# Patient Record
Sex: FEMALE | Race: BLACK OR AFRICAN AMERICAN | NOT HISPANIC OR LATINO | URBAN - METROPOLITAN AREA
[De-identification: names, ages, dates, MRNs, and addresses within clinical notes are randomized per-mention and may not be internally consistent; named-entity substitution may affect disease eponyms.]

---

## 2018-01-25 ENCOUNTER — EMERGENCY (EMERGENCY)
Facility: HOSPITAL | Age: 23
LOS: 1 days | Discharge: HOME | End: 2018-01-25
Attending: EMERGENCY MEDICINE
Payer: MEDICAID

## 2018-01-25 VITALS
HEIGHT: 62 IN | HEART RATE: 117 BPM | TEMPERATURE: 102 F | WEIGHT: 190.04 LBS | DIASTOLIC BLOOD PRESSURE: 81 MMHG | OXYGEN SATURATION: 99 % | RESPIRATION RATE: 20 BRPM | SYSTOLIC BLOOD PRESSURE: 141 MMHG

## 2018-01-25 PROCEDURE — 99284 EMERGENCY DEPT VISIT MOD MDM: CPT | Mod: 25

## 2018-01-25 PROCEDURE — 99284 EMERGENCY DEPT VISIT MOD MDM: CPT

## 2018-01-25 PROCEDURE — 96374 THER/PROPH/DIAG INJ IV PUSH: CPT

## 2018-01-25 RX ORDER — SODIUM CHLORIDE 9 MG/ML
1000 INJECTION INTRAMUSCULAR; INTRAVENOUS; SUBCUTANEOUS ONCE
Qty: 0 | Refills: 0 | Status: COMPLETED | OUTPATIENT
Start: 2018-01-25 | End: 2018-01-25

## 2018-01-25 RX ORDER — KETOROLAC TROMETHAMINE 30 MG/ML
30 SYRINGE (ML) INJECTION ONCE
Qty: 0 | Refills: 0 | Status: DISCONTINUED | OUTPATIENT
Start: 2018-01-25 | End: 2018-01-25

## 2018-01-25 RX ADMIN — SODIUM CHLORIDE 4000 MILLILITER(S): 9 INJECTION INTRAMUSCULAR; INTRAVENOUS; SUBCUTANEOUS at 10:38

## 2018-01-25 RX ADMIN — Medication 30 MILLIGRAM(S): at 10:38

## 2018-01-25 NOTE — ED PROVIDER NOTE - MEDICAL DECISION MAKING DETAILS
feels better after meds.  Discussed anticipatory guidance and return precautions. Discussed results and gave copy to pt.  Dc with outpt follow up.

## 2018-01-25 NOTE — ED PROVIDER NOTE - OBJECTIVE STATEMENT
23 y/o F pt w/ no PMHx presents to the ED c/o flu-like sx x yesterday. Pt reports a fever of 102 this morning as well as body aches, weakness, and coughing. No other complaints. NKDA.

## 2018-04-09 ENCOUNTER — APPOINTMENT (OUTPATIENT)
Dept: OBGYN | Facility: CLINIC | Age: 23
End: 2018-04-09
Payer: MEDICAID

## 2018-04-09 ENCOUNTER — RESULT REVIEW (OUTPATIENT)
Age: 23
End: 2018-04-09

## 2018-04-09 ENCOUNTER — OUTPATIENT (OUTPATIENT)
Dept: OUTPATIENT SERVICES | Facility: HOSPITAL | Age: 23
LOS: 1 days | End: 2018-04-09
Payer: MEDICAID

## 2018-04-09 VITALS
DIASTOLIC BLOOD PRESSURE: 75 MMHG | WEIGHT: 203 LBS | TEMPERATURE: 98.2 F | HEIGHT: 62 IN | BODY MASS INDEX: 37.36 KG/M2 | SYSTOLIC BLOOD PRESSURE: 109 MMHG | HEART RATE: 68 BPM

## 2018-04-09 DIAGNOSIS — Z30.09 ENCOUNTER FOR OTHER GENERAL COUNSELING AND ADVICE ON CONTRACEPTION: ICD-10-CM

## 2018-04-09 DIAGNOSIS — Z00.00 ENCOUNTER FOR GENERAL ADULT MEDICAL EXAMINATION WITHOUT ABNORMAL FINDINGS: ICD-10-CM

## 2018-04-09 DIAGNOSIS — Z01.419 ENCOUNTER FOR GYNECOLOGICAL EXAMINATION (GENERAL) (ROUTINE) W/OUT ABNORMAL FINDINGS: ICD-10-CM

## 2018-04-09 PROCEDURE — G0463: CPT

## 2018-04-09 PROCEDURE — 99385 PREV VISIT NEW AGE 18-39: CPT

## 2018-04-09 RX ORDER — NORGESTREL AND ETHINYL ESTRADIOL 0.3-0.03MG
0.3-3 KIT ORAL DAILY
Qty: 28 | Refills: 5 | Status: ACTIVE | COMMUNITY
Start: 2018-04-09 | End: 1900-01-01

## 2018-04-13 LAB
SPECIMEN SOURCE: SIGNIFICANT CHANGE UP
T VAGINALIS RRNA SPEC QL NAA+PROBE: SIGNIFICANT CHANGE UP

## 2018-04-17 DIAGNOSIS — Z01.419 ENCOUNTER FOR GYNECOLOGICAL EXAMINATION (GENERAL) (ROUTINE) WITHOUT ABNORMAL FINDINGS: ICD-10-CM

## 2018-04-17 DIAGNOSIS — Z30.09 ENCOUNTER FOR OTHER GENERAL COUNSELING AND ADVICE ON CONTRACEPTION: ICD-10-CM

## 2018-05-03 ENCOUNTER — TRANSCRIPTION ENCOUNTER (OUTPATIENT)
Age: 23
End: 2018-05-03

## 2018-11-27 ENCOUNTER — TRANSCRIPTION ENCOUNTER (OUTPATIENT)
Age: 23
End: 2018-11-27

## 2020-01-02 NOTE — ED ADULT NURSE NOTE - CAS TRG GEN SKIN COLOR

## 2020-08-30 ENCOUNTER — EMERGENCY (EMERGENCY)
Facility: HOSPITAL | Age: 25
LOS: 1 days | Discharge: HOME | End: 2020-08-30
Attending: STUDENT IN AN ORGANIZED HEALTH CARE EDUCATION/TRAINING PROGRAM
Payer: MEDICAID

## 2020-08-30 VITALS
TEMPERATURE: 99 F | HEART RATE: 88 BPM | SYSTOLIC BLOOD PRESSURE: 120 MMHG | WEIGHT: 199.96 LBS | OXYGEN SATURATION: 98 % | DIASTOLIC BLOOD PRESSURE: 75 MMHG | RESPIRATION RATE: 16 BRPM | HEIGHT: 63 IN

## 2020-08-30 PROCEDURE — 99283 EMERGENCY DEPT VISIT LOW MDM: CPT | Mod: 25

## 2020-08-30 PROCEDURE — 73630 X-RAY EXAM OF FOOT: CPT

## 2020-08-30 PROCEDURE — 29515 APPLICATION SHORT LEG SPLINT: CPT | Mod: LT

## 2020-08-30 PROCEDURE — 73630 X-RAY EXAM OF FOOT: CPT | Mod: 26,LT

## 2020-08-30 RX ORDER — IBUPROFEN 200 MG
600 TABLET ORAL ONCE
Refills: 0 | Status: COMPLETED | OUTPATIENT
Start: 2020-08-30 | End: 2020-08-30

## 2020-08-30 RX ADMIN — Medication 600 MILLIGRAM(S): at 19:40

## 2020-08-30 NOTE — ED PROVIDER NOTE - ATTENDING CONTRIBUTION TO CARE
I performed the initial face to face bedside interview with this patient regarding history of present illness, review of symptoms and past medical, social and family history.  I completed an independent physical examination.  I was the initial provider who evaluated this patient.  The history, review of symptoms and examination was documented by the scribe in my presence and I attest to the accuracy of the documentation.  I have signed out the follow up of any pending tests (i.e. labs, radiological studies) to the PA/NP.  I have discussed the patient’s plan of care and disposition with the PA/NP. well appearing female, no acute distress, limping gait. concern for fracture. xrays.

## 2020-08-30 NOTE — ED PROVIDER NOTE - NSFOLLOWUPINSTRUCTIONS_ED_ALL_ED_FT
Follow up with the podiatry clinic within 1 week.  For pain you can take over the counter Ibuprofen 600 mg orally every 6 hours as needed for pain. Take medication with food.   If you experience any new or worsening symptoms or if you are concerned you can always come back to the emergency for a re-evaluation.

## 2020-08-30 NOTE — ED PROVIDER NOTE - OBJECTIVE STATEMENT
24 y/o F pt with no significant PMHx presents to ED s/p fall downstairs today c/o L foot pain. Pt reports she was walking downstairs and was in a rush when she misstepped and fell onto her side. Pt states she heard a crack from her L foot and was limping immediately after the fall. Per pt she went to her car afterwards where her pain and swelling worsened causing her the inability to ambulate. Pt denies LOC, tingling, numbness, head injury, wrist pain, elbow pain, shoulder pain, back pain, daily medication use, or any other acute complaints. NKDA.

## 2020-08-30 NOTE — ED PROVIDER NOTE - PROGRESS NOTE DETAILS
XR shows no fracture but due to localized swelling/tenderness, inability to walk and felt of "crack sensation" during injury will apply posterior splint due to possible occult fracture. Patient will need to follow up with podiatry within 1 week. Non-weight bearing with crutches. Pt is well appearing walking with steady gait, stable for discharge and follow up without fail with medical doctor. I had a detailed discussion with the patient and/or guardian regarding the historical points, exam findings, and any diagnostic results supporting the discharge diagnosis. Pt educated on care and need for follow up. Strict return instructions and red flag signs and symptoms discussed with patient. Questions answered. Pt shows understanding of discharge information and agrees to follow.

## 2020-08-30 NOTE — ED ADULT NURSE NOTE - OBJECTIVE STATEMENT
Pt c/o left ankle pain after tripped on last step going down stairs. Pt denies hitting head, LOC. No acute distress noted, denies chest pain, no shortness of breath indicated. Safety maintained.

## 2020-08-30 NOTE — ED ADULT NURSE NOTE - CAS ELECT INFOMATION PROVIDED
crutches instruction given via teach back and demonstration pt ambulated in hallway with steady gait with crutches/DC instructions

## 2020-08-30 NOTE — ED PROVIDER NOTE - PATIENT PORTAL LINK FT
You can access the FollowMyHealth Patient Portal offered by [facility] by registering at the following website: [email or web address]. By joining [employer]’s FollowMyHealth portal, you will also be able to view your health information using other applications (apps) compatible with our system.

## 2020-08-30 NOTE — ED PROVIDER NOTE - NSFOLLOWUPCLINICS_GEN_ALL_ED_FT
[facility]  Podiatry/Wound Care  [address] [place] [address]  Phone: [phone]  Fax: [phone]  Follow Up Time:

## 2020-09-07 ENCOUNTER — EMERGENCY (EMERGENCY)
Facility: HOSPITAL | Age: 25
LOS: 1 days | Discharge: HOME | End: 2020-09-07
Attending: EMERGENCY MEDICINE
Payer: MEDICAID

## 2020-09-07 VITALS
SYSTOLIC BLOOD PRESSURE: 118 MMHG | TEMPERATURE: 98 F | DIASTOLIC BLOOD PRESSURE: 74 MMHG | HEART RATE: 68 BPM | RESPIRATION RATE: 17 BRPM | HEIGHT: 63 IN | OXYGEN SATURATION: 98 % | WEIGHT: 199.96 LBS

## 2020-09-07 PROCEDURE — 99283 EMERGENCY DEPT VISIT LOW MDM: CPT | Mod: 25

## 2020-09-07 PROCEDURE — 73630 X-RAY EXAM OF FOOT: CPT

## 2020-09-07 PROCEDURE — 73630 X-RAY EXAM OF FOOT: CPT | Mod: 26,LT

## 2020-09-07 PROCEDURE — 29515 APPLICATION SHORT LEG SPLINT: CPT | Mod: LT

## 2020-09-07 NOTE — ED PROVIDER NOTE - ATTENDING CONTRIBUTION TO CARE
26 yo with left foot ecchymosis s/p fall on August 30th. XR with re-demonstrated possible 5th metatarsal fx. Re-splinted. To follow up with podiatry.    I performed the initial face to face bedside interview with this patient regarding history of present illness, review of symptoms and past medical, social and family history.  I completed an independent physical examination.  I was the initial provider who evaluated this patient.  The history, review of symptoms and examination was documented by the scribe in my presence and I attest to the accuracy of the documentation.  I have signed out the follow up of any pending tests (i.e. labs, radiological studies) to the NP. I have discussed the patient’s plan of care and disposition with the NP.

## 2020-09-07 NOTE — ED PROVIDER NOTE - PROGRESS NOTE DETAILS
Tolerated splinting well.  FU with podiatry in one week Pt is well appearing walking with steady gait, stable for discharge and follow up without fail with medical doctor. I had a detailed discussion with the patient and/or guardian regarding the historical points, exam findings, and any diagnostic results supporting the discharge diagnosis. Pt educated on care and need for follow up. Strict return instructions and red flag signs and symptoms discussed with patient. Questions answered. Pt shows understanding of discharge information and agrees to follow.

## 2020-09-07 NOTE — ED PROVIDER NOTE - CLINICAL SUMMARY MEDICAL DECISION MAKING FREE TEXT BOX
24 yo F with left foot ecchymosis and swelling. patient with questionable 5th metatarsal fx on Aug 30th. patient has been partially weightbearing and using crutches. patient with improper use of crutches while in the ED. Crutch training provided again. Also placed in new posterior splint. 5th metatarsal fracture visualized again on XR. Nontoxic and medically stable for discharge. Return precautions provided and patient understands to return to the ED for worsening signs and symptoms. Instructed to follow up with podiatry and agreeable. Patient's questions answered and given copies of lab results.

## 2020-09-07 NOTE — ED PROVIDER NOTE - PHYSICAL EXAMINATION
Ortho;    Ecchymosis to base of 2nd to 4th metatarsal   DP PT pulses 2/4  Full range of motion of all 5 metatarsals and left ankle.   Good CAP refill.

## 2020-09-07 NOTE — ED PROVIDER NOTE - PATIENT PORTAL LINK FT
You can access the FollowMyHealth Patient Portal offered by [facility] by registering at the following website: [email or web address]. By joining [employer]’s FollowMyHealth portal, you will also be able to view your health information using other applications (apps) compatible with our system.

## 2020-09-07 NOTE — ED PROVIDER NOTE - OBJECTIVE STATEMENT
24 y/o F patient with no significant PMHx presents to the ED c/o left foot discoloration and ecchymosis. Patient was seen her on August 30th after spraining foot while walking with platform shoes. Patient states she has been weight bearing ans using crutches. Patient has had no follow up with podiatry. X-ray on august 30th showed a questionable 5 metatarsal fracture, patient was splinted and given crutches at that time. Denies any pain, or any other complaints. 26 y/o F patient with no significant PMHx presents to the ED c/o left foot discoloration and ecchymosis. Patient was seen her on August 30th after spraining her foot while walking in platform shoes. Patient states she has been weight bearing and using crutches. Patient has not followed up with podiatry yest. X-ray on august 30th showed a questionable 5 metatarsal fracture. Denies fevers, nausea, emesis, chest pain, shortness of breath, abdominal pain, dysuria, hematuria, diarrhea, constipation, or any other acute complaints.

## 2020-09-07 NOTE — ED PROCEDURE NOTE - ATTENDING CONTRIBUTION TO CARE
Procedure performed by NP.     Patient discharged by NP. I evaluated the patient only on initial arrival and was available for general supervision throughout stay.

## 2020-09-30 ENCOUNTER — EMERGENCY (EMERGENCY)
Facility: HOSPITAL | Age: 25
LOS: 1 days | Discharge: HOME | End: 2020-09-30
Attending: EMERGENCY MEDICINE
Payer: MEDICAID

## 2020-09-30 VITALS
RESPIRATION RATE: 16 BRPM | OXYGEN SATURATION: 97 % | DIASTOLIC BLOOD PRESSURE: 83 MMHG | HEIGHT: 63 IN | HEART RATE: 101 BPM | SYSTOLIC BLOOD PRESSURE: 155 MMHG | TEMPERATURE: 98 F

## 2020-09-30 VITALS
RESPIRATION RATE: 20 BRPM | OXYGEN SATURATION: 100 % | TEMPERATURE: 99 F | SYSTOLIC BLOOD PRESSURE: 112 MMHG | HEART RATE: 71 BPM | DIASTOLIC BLOOD PRESSURE: 72 MMHG

## 2020-09-30 LAB
ALBUMIN SERPL ELPH-MCNC: 3.6 G/DL — SIGNIFICANT CHANGE UP (ref 3.5–5)
ALP SERPL-CCNC: 39 U/L — LOW (ref 40–120)
ALT FLD-CCNC: 40 U/L DA — SIGNIFICANT CHANGE UP (ref 10–60)
ANION GAP SERPL CALC-SCNC: 5 MMOL/L — SIGNIFICANT CHANGE UP (ref 5–17)
APPEARANCE UR: CLEAR — SIGNIFICANT CHANGE UP
AST SERPL-CCNC: 18 U/L — SIGNIFICANT CHANGE UP (ref 10–40)
BACTERIA # UR AUTO: ABNORMAL /HPF
BASOPHILS # BLD AUTO: 0.03 K/UL — SIGNIFICANT CHANGE UP (ref 0–0.2)
BASOPHILS NFR BLD AUTO: 0.7 % — SIGNIFICANT CHANGE UP (ref 0–2)
BILIRUB SERPL-MCNC: 0.3 MG/DL — SIGNIFICANT CHANGE UP (ref 0.2–1.2)
BILIRUB UR-MCNC: NEGATIVE — SIGNIFICANT CHANGE UP
BUN SERPL-MCNC: 13 MG/DL — SIGNIFICANT CHANGE UP (ref 7–18)
CALCIUM SERPL-MCNC: 9.1 MG/DL — SIGNIFICANT CHANGE UP (ref 8.4–10.5)
CHLORIDE SERPL-SCNC: 103 MMOL/L — SIGNIFICANT CHANGE UP (ref 96–108)
CO2 SERPL-SCNC: 27 MMOL/L — SIGNIFICANT CHANGE UP (ref 22–31)
COLOR SPEC: YELLOW — SIGNIFICANT CHANGE UP
CREAT SERPL-MCNC: 0.96 MG/DL — SIGNIFICANT CHANGE UP (ref 0.5–1.3)
DIFF PNL FLD: NEGATIVE — SIGNIFICANT CHANGE UP
EOSINOPHIL # BLD AUTO: 0.18 K/UL — SIGNIFICANT CHANGE UP (ref 0–0.5)
EOSINOPHIL NFR BLD AUTO: 4.5 % — SIGNIFICANT CHANGE UP (ref 0–6)
EPI CELLS # UR: SIGNIFICANT CHANGE UP /HPF
GLUCOSE SERPL-MCNC: 81 MG/DL — SIGNIFICANT CHANGE UP (ref 70–99)
GLUCOSE UR QL: NEGATIVE — SIGNIFICANT CHANGE UP
HCG UR QL: NEGATIVE — SIGNIFICANT CHANGE UP
HCT VFR BLD CALC: 36.6 % — SIGNIFICANT CHANGE UP (ref 34.5–45)
HGB BLD-MCNC: 12.9 G/DL — SIGNIFICANT CHANGE UP (ref 11.5–15.5)
IMM GRANULOCYTES NFR BLD AUTO: 0.2 % — SIGNIFICANT CHANGE UP (ref 0–1.5)
KETONES UR-MCNC: ABNORMAL
LEUKOCYTE ESTERASE UR-ACNC: NEGATIVE — SIGNIFICANT CHANGE UP
LIDOCAIN IGE QN: 109 U/L — SIGNIFICANT CHANGE UP (ref 73–393)
LYMPHOCYTES # BLD AUTO: 1.92 K/UL — SIGNIFICANT CHANGE UP (ref 1–3.3)
LYMPHOCYTES # BLD AUTO: 47.9 % — HIGH (ref 13–44)
MCHC RBC-ENTMCNC: 31.2 PG — SIGNIFICANT CHANGE UP (ref 27–34)
MCHC RBC-ENTMCNC: 35.2 GM/DL — SIGNIFICANT CHANGE UP (ref 32–36)
MCV RBC AUTO: 88.4 FL — SIGNIFICANT CHANGE UP (ref 80–100)
MONOCYTES # BLD AUTO: 0.31 K/UL — SIGNIFICANT CHANGE UP (ref 0–0.9)
MONOCYTES NFR BLD AUTO: 7.7 % — SIGNIFICANT CHANGE UP (ref 2–14)
NEUTROPHILS # BLD AUTO: 1.56 K/UL — LOW (ref 1.8–7.4)
NEUTROPHILS NFR BLD AUTO: 39 % — LOW (ref 43–77)
NITRITE UR-MCNC: NEGATIVE — SIGNIFICANT CHANGE UP
NRBC # BLD: 0 /100 WBCS — SIGNIFICANT CHANGE UP (ref 0–0)
PH UR: 5 — SIGNIFICANT CHANGE UP (ref 5–8)
PLATELET # BLD AUTO: 210 K/UL — SIGNIFICANT CHANGE UP (ref 150–400)
POTASSIUM SERPL-MCNC: 3.7 MMOL/L — SIGNIFICANT CHANGE UP (ref 3.5–5.3)
POTASSIUM SERPL-SCNC: 3.7 MMOL/L — SIGNIFICANT CHANGE UP (ref 3.5–5.3)
PROT SERPL-MCNC: 8.3 G/DL — SIGNIFICANT CHANGE UP (ref 6–8.3)
PROT UR-MCNC: NEGATIVE — SIGNIFICANT CHANGE UP
RBC # BLD: 4.14 M/UL — SIGNIFICANT CHANGE UP (ref 3.8–5.2)
RBC # FLD: 12 % — SIGNIFICANT CHANGE UP (ref 10.3–14.5)
RBC CASTS # UR COMP ASSIST: SIGNIFICANT CHANGE UP /HPF (ref 0–2)
SODIUM SERPL-SCNC: 135 MMOL/L — SIGNIFICANT CHANGE UP (ref 135–145)
SP GR SPEC: 1.02 — SIGNIFICANT CHANGE UP (ref 1.01–1.02)
UROBILINOGEN FLD QL: NEGATIVE — SIGNIFICANT CHANGE UP
WBC # BLD: 4.01 K/UL — SIGNIFICANT CHANGE UP (ref 3.8–10.5)
WBC # FLD AUTO: 4.01 K/UL — SIGNIFICANT CHANGE UP (ref 3.8–10.5)
WBC UR QL: SIGNIFICANT CHANGE UP /HPF (ref 0–5)

## 2020-09-30 PROCEDURE — 81025 URINE PREGNANCY TEST: CPT

## 2020-09-30 PROCEDURE — 81001 URINALYSIS AUTO W/SCOPE: CPT

## 2020-09-30 PROCEDURE — 80053 COMPREHEN METABOLIC PANEL: CPT

## 2020-09-30 PROCEDURE — 87086 URINE CULTURE/COLONY COUNT: CPT

## 2020-09-30 PROCEDURE — 85025 COMPLETE CBC W/AUTO DIFF WBC: CPT

## 2020-09-30 PROCEDURE — 96374 THER/PROPH/DIAG INJ IV PUSH: CPT

## 2020-09-30 PROCEDURE — 99284 EMERGENCY DEPT VISIT MOD MDM: CPT | Mod: 25

## 2020-09-30 PROCEDURE — 83690 ASSAY OF LIPASE: CPT

## 2020-09-30 PROCEDURE — 96375 TX/PRO/DX INJ NEW DRUG ADDON: CPT

## 2020-09-30 PROCEDURE — 99284 EMERGENCY DEPT VISIT MOD MDM: CPT

## 2020-09-30 PROCEDURE — 36415 COLL VENOUS BLD VENIPUNCTURE: CPT

## 2020-09-30 RX ORDER — FAMOTIDINE 10 MG/ML
20 INJECTION INTRAVENOUS ONCE
Refills: 0 | Status: COMPLETED | OUTPATIENT
Start: 2020-09-30 | End: 2020-09-30

## 2020-09-30 RX ORDER — ONDANSETRON 8 MG/1
4 TABLET, FILM COATED ORAL ONCE
Refills: 0 | Status: COMPLETED | OUTPATIENT
Start: 2020-09-30 | End: 2020-09-30

## 2020-09-30 RX ORDER — SODIUM CHLORIDE 9 MG/ML
1000 INJECTION INTRAMUSCULAR; INTRAVENOUS; SUBCUTANEOUS ONCE
Refills: 0 | Status: COMPLETED | OUTPATIENT
Start: 2020-09-30 | End: 2020-09-30

## 2020-09-30 RX ADMIN — Medication 30 MILLILITER(S): at 21:42

## 2020-09-30 RX ADMIN — SODIUM CHLORIDE 1000 MILLILITER(S): 9 INJECTION INTRAMUSCULAR; INTRAVENOUS; SUBCUTANEOUS at 21:42

## 2020-09-30 RX ADMIN — ONDANSETRON 4 MILLIGRAM(S): 8 TABLET, FILM COATED ORAL at 21:43

## 2020-09-30 RX ADMIN — FAMOTIDINE 20 MILLIGRAM(S): 10 INJECTION INTRAVENOUS at 21:42

## 2020-09-30 NOTE — ED PROVIDER NOTE - OBJECTIVE STATEMENT
25 y.o female with a PMHx of PCOS and no PSHx presents to the ED c.o x3-4 days of abdominal cramping , hot flashes and bloating. Patient  endorses because of her PCOS she does have irregular periods. Patient endorses her last bowel movement was yesterday and it was normal. patient Denies fevers, nausea, emesis, chest pain, shortness of breath, dysuria, hematuria, diarrhea, constipation, or any other acute complaints. NKDA 26 yo female with a PMHx of PCOS and no PSHx presents to the ED c.o x3-4 days of abdominal cramping , hot flashes, and bloating. Has irregular periods because of her PCOS. Patient endorses her last bowel movement was yesterday and it was normal. Denies, emesis, chest pain, shortness of breath, dysuria, hematuria, diarrhea, constipation, or any other acute complaints. NKDA

## 2020-09-30 NOTE — ED PROVIDER NOTE - PATIENT PORTAL LINK FT
You can access the FollowMyHealth Patient Portal offered by [facility] by registering at the following website: [email or web address]. By joining [employer]’s FollowMyHealth portal, you will also be able to view your health information using other applications (apps) compatible with our system.

## 2020-09-30 NOTE — ED PROVIDER NOTE - NSFOLLOWUPINSTRUCTIONS_ED_ALL_ED_FT
You were seen today for your abdominal cramping and bloating. You may take Maalox and pepcid as needed for your symptoms. Please return to the Emergency Department for worsening signs or symptoms.      Gas and Bloating    WHAT YOU NEED TO KNOW:    Gas forms inside your body when you eat certain foods or swallow too much air. Bloating is the tight, full feeling you get from too much gas.    DISCHARGE INSTRUCTIONS:    Medicines:   •Gas relief medicines: These may help decrease gas pain and bloating. These can be bought without a doctor's order.      •Take your medicine as directed. Contact your healthcare provider if you think your medicine is not helping or if you have side effects. Tell him or her if you are allergic to any medicine. Keep a list of the medicines, vitamins, and herbs you take. Include the amounts, and when and why you take them. Bring the list or the pill bottles to follow-up visits. Carry your medicine list with you in case of an emergency.      How to manage gas and bloating:   •Keep a log: Write down what you eat and drink and how often you pass gas each day.       •Eat and drink slowly: Choose foods that do not cause gas, such as meat, poultry, fish, and eggs. Avoid high-fat foods and vegetables or starches that can cause gas. Do not drink carbonated drinks. Add foods back into your diet one at a time after about a week. If the food causes symptoms, avoid it.      •Exercise: Exercise can help relieve gas.       •Do not smoke or chew gum: This can cause you to swallow air.      •Make sure your dentures fit properly: Have your dentures fixed if they are loose. Loose dentures can cause you to swallow too much air.      Follow up with your healthcare provider as directed: Write down your questions so you remember to ask them during your visits.     Contact your healthcare provider if:   •You have a fever.      •You vomit or have diarrhea.      •You lose weight without trying.      •You have questions or concerns about your condition or care.      Return to the emergency department if:   •You have severe abdominal pain.      •You have blood in your bowel movement.

## 2020-09-30 NOTE — ED PROVIDER NOTE - CARE PROVIDER_API CALL
Emmanuel Gómez [address] [facility], [place] [address]  Phone: [phone]  Fax: [phone]  Follow Up Time: Routine

## 2020-09-30 NOTE — ED PROVIDER NOTE - CONSTITUTIONAL, MLM
normal... Well appearing, awake, alert, oriented to person, place, time/situation and in no apparent distress. Well appearing, awake, alert, oriented to person, place, time/situation and in no apparent distress. obese

## 2020-09-30 NOTE — ED PROVIDER NOTE - CLINICAL SUMMARY MEDICAL DECISION MAKING FREE TEXT BOX
24 yo F with abdominal cramping and bloating. Benign abdominal exam. Patients labs grossly unremarkable. Improved with GI cocktail. Possible IBS. Pending UA. Plan of care signed out to Dr. Foley. 24 yo F with abdominal cramping and bloating. Benign abdominal exam. Patients labs grossly unremarkable. Improved with GI cocktail. Possible IBS. Pending UA. Plan of care signed out to Dr. Foley.    Alaina [id number]  At signout pt states that she is feeling well and all her pain has resolved after passing gas. Pt currently being tx for cystitis by PMD, on day 3 of ABx with improving symptoms and no fever. Labs unremarkable and ABD exam completely exam. Most likely symptoms 2/2 nonemergent etiology, details of case, history, and exam making a more emergent diagnosis much less likely. Discussed with pt my clinical impression and results, patient given strict return precautions if persistent or worsening of symptoms occurs, and need for close follow up. Pt well appearing with a reassuring exam. Pt expressed understanding and agrees with plan. Discharge home with PMD or Specialist f/u within 5 days.

## 2020-10-02 LAB
CULTURE RESULTS: SIGNIFICANT CHANGE UP
SPECIMEN SOURCE: SIGNIFICANT CHANGE UP

## 2020-12-16 PROBLEM — Z01.419 ENCOUNTER FOR ANNUAL ROUTINE GYNECOLOGICAL EXAMINATION: Status: RESOLVED | Noted: 2018-04-09 | Resolved: 2020-12-16

## 2021-05-26 ENCOUNTER — TRANSCRIPTION ENCOUNTER (OUTPATIENT)
Age: 26
End: 2021-05-26

## 2022-01-10 ENCOUNTER — EMERGENCY (EMERGENCY)
Facility: HOSPITAL | Age: 27
LOS: 1 days | Discharge: HOME | End: 2022-01-10
Attending: EMERGENCY MEDICINE
Payer: MEDICAID

## 2022-01-10 VITALS
WEIGHT: 233.69 LBS | OXYGEN SATURATION: 100 % | RESPIRATION RATE: 16 BRPM | SYSTOLIC BLOOD PRESSURE: 131 MMHG | HEIGHT: 63 IN | TEMPERATURE: 98 F | DIASTOLIC BLOOD PRESSURE: 85 MMHG | HEART RATE: 67 BPM

## 2022-01-10 PROCEDURE — 99283 EMERGENCY DEPT VISIT LOW MDM: CPT

## 2022-01-10 PROCEDURE — 99282 EMERGENCY DEPT VISIT SF MDM: CPT

## 2022-01-10 NOTE — ED PROVIDER NOTE - OBJECTIVE STATEMENT
25 yo F pmh of PCOS presents with questions about recent diagnosis of "pulsatile tinnitus" diagnosed by ENT. Has had many months of intermittent pulsing in R ear and some mild HA and neck discomfort. 25 yo F pmh of PCOS presents with questions about recent diagnosis of "pulsatile tinnitus" diagnosed by ENT. Has had many months of intermittent pulsing in R ear and some mild HA and neck discomfort. Saw ENT who scheduled for MRI in 2 days. Pt googled symptoms and is worried about aneurysm or tumor. Denies other acute complaints.

## 2022-01-10 NOTE — ED PROVIDER NOTE - PHYSICAL EXAMINATION
GENERAL: well appearing, no acute distress   HEAD: atraumatic   EYES: EOMI, pink conjunctiva   ENT: moist oral mucosa; ears and TMs wnl   CARDIAC: RRR, no edema, distal pulses present   RESPIRATORY: lungs CTAB, no increased work of breathing   GASTROINTESTINAL: no abdominal tenderness, no rebound or guarding, bowel sounds presents  GENITOURINARY: no CVA tenderness   MUSCULOSKELETAL: no deformity   NEUROLOGICAL: AAOx3, CN's II-XII intact, strength 5/5 bilateral UE and LE, sensation intact to light touch, finger to nose intact, steady gait  SKIN: intact   PSYCHIATRIC: cooperative  HEME LYMPH: no lymphadenopathy

## 2022-01-10 NOTE — ED PROVIDER NOTE - PATIENT PORTAL LINK FT
You can access the FollowMyHealth Patient Portal offered by [facility] by registering at the following website: [email or web address]. By joining [employer]’s FollowMyHealth portal, you will also be able to view your health information using other applications (apps) compatible with our system.

## 2022-01-10 NOTE — ED PROVIDER NOTE - NS ED ROS FT
CONSTITUTIONAL: no fever, no chills   EYES: no visual changes, no eye pain   ENMT: no nasal congestion, no throat pain, +tinnitus   CARDIOVASCULAR: no chest pain, no edema, no palpitations   RESPIRATORY: no shortness of breath, no cough   GASTROINTESTINAL: no abdominal pain, no nausea, no vomiting, no diarrhea, no constipation   GENITOURINARY: no dysuria, no frequency  MUSCULOSKELETAL: no joint pains, no myalgias, no back pain   SKIN: no rashes  NEUROLOGICAL: no weakness, no headache, no dizziness, no slurred speech, no syncope   PSYCHIATRIC: no known mental health illness   HEME/LYMPH: no lymphadenopathy      All other ROS negative except as per HPI

## 2022-01-10 NOTE — ED PROVIDER NOTE - NSFOLLOWUPCLINICS_GEN_ALL_ED_FT
[facility]  Neurology  [address] [place] [address]  Phone: [phone]  Fax: [phone]

## 2022-01-10 NOTE — ED PROVIDER NOTE - CLINICAL SUMMARY MEDICAL DECISION MAKING FREE TEXT BOX
27 yo F with chronic tinnitus. normal VS and neuro exam. Offered pt pain meds, she declined. Has out pt MRI fu. Discussed indications for patient return to ED. Patient understood.

## 2022-01-10 NOTE — ED ADULT TRIAGE NOTE - CHIEF COMPLAINT QUOTE
" I was diagnosed last with pulsatile tinnitus and I have been suffering for a year and my symptoms gotten worse for the past week.  I was told I need MRI

## 2022-02-24 ENCOUNTER — APPOINTMENT (OUTPATIENT)
Dept: NEUROSURGERY | Facility: CLINIC | Age: 27
End: 2022-02-24
Payer: MEDICAID

## 2022-02-24 PROCEDURE — 99203 OFFICE O/P NEW LOW 30 MIN: CPT | Mod: 95

## 2022-02-24 NOTE — REASON FOR VISIT
[New Patient Visit] : a new patient visit [Home] : at home, [unfilled] , at the time of the visit. [Medical Office: ([facility])___] : at the medical office located in  [Verbal consent obtained from patient] : the patient, [unfilled]

## 2022-03-01 ENCOUNTER — RESULT REVIEW (OUTPATIENT)
Age: 27
End: 2022-03-01

## 2022-03-01 NOTE — ASSESSMENT
[FreeTextEntry1] : Impression:\par LEFT Pulsatile Tinnitus\par \par We discussed possible causes of pulsatile tinnitus. These include:\par ENT causes such as semicircular canal dehiscence, tumor, ototoxicity\par Cardiac causes such as aortic stenosis, valve disease, other causes of heart murmur.\par Hypertension.\par Anemia.\par Thyroid disease.\par Cerebrovascular problems: arteriovenous malformation (AVM), dural arteriovenous fistula (DAVF), Venous sinus stenosis, venous aneurysm, large trans-mastoid emissary vein, carotid stenosis, jugular bulb diverticulum.\par Idiopathic Intracranial Hypertension.\par \par Plan:\par MRA Head w/wo TRICKS Protocol to r/o AVM or dAVF\par MRV head w/wo to r/o venous stenosis \par Follow Up with Me  After Imaging

## 2022-03-01 NOTE — HISTORY OF PRESENT ILLNESS
[de-identified] : The patient is a pleasant 26 year old woman who presents today with a chief complaint of RIGHT ear pulsatile tinnitus.  It first started about over a year ago without any specific event.  However, she has history PCOS and it taking bith control and metformin Since that time it has been getting progressively worse.  It is described as an intermittent, whooshing sound, pt is unsure if it is in sync with her pulse.  Pressing on the RIGHT side of the neck  make the sound go away. Pt has been  seen by opthalmology but reports no abnormalities . She also was evaluated by ENT but was told everything was normal.  MRI IAC did not revealed any abnormalities

## 2022-03-02 ENCOUNTER — APPOINTMENT (OUTPATIENT)
Dept: MRI IMAGING | Facility: CLINIC | Age: 27
End: 2022-03-02
Payer: MEDICAID

## 2022-03-02 ENCOUNTER — OUTPATIENT (OUTPATIENT)
Dept: OUTPATIENT SERVICES | Facility: HOSPITAL | Age: 27
LOS: 1 days | End: 2022-03-02

## 2022-03-02 PROCEDURE — 70546 MR ANGIOGRAPH HEAD W/O&W/DYE: CPT | Mod: 26

## 2022-03-17 ENCOUNTER — APPOINTMENT (OUTPATIENT)
Dept: NEUROSURGERY | Facility: CLINIC | Age: 27
End: 2022-03-17
Payer: MEDICAID

## 2022-03-17 PROCEDURE — 99213 OFFICE O/P EST LOW 20 MIN: CPT | Mod: 95

## 2022-03-17 NOTE — REASON FOR VISIT
[Follow-Up: _____] : a [unfilled] follow-up visit [Home] : at home, [unfilled] , at the time of the visit. [Verbal consent obtained from patient] : the patient, [unfilled] [Medical Office: ([facility])___] : at the medical office located in

## 2022-03-17 NOTE — HISTORY OF PRESENT ILLNESS
[de-identified] : Ms. Cline is a pleasant 26 year old woman who presents today for follow up of RIGHT ear pulsatile tinnitus and MRA/MRV review.  Her pulsatile tinnitus first started over a year ago without any specific event.  However, she has history PCOS and is taking birth control and metformin Since that time it has been getting progressively worse.  It is described as an intermittent, whooshing sound, pt is unsure if it is in sync with her pulse.  Pressing on the RIGHT side of the neck makes the sound go away. Pt has been  seen by opthalmology but reports no abnormalities . She also was evaluated by ENT but was told everything was normal.  MRI IAC did not revealed any abnormalities

## 2022-03-17 NOTE — ASSESSMENT
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 This is a sequel of chronic severe stenosis at the junction of the transverse and sigmoid venous sinuses. We discussed the natural history of intracranial venous aneurysms and I explained to the patient that these aneurysms DO NOT cause intracranial hemorrhage or stroke. \par \par The pulsatile tinnitus is severe and has a negative impact in social life, work and daily living. We discussed treatment options which include observation, trial of Diamox, endovascular treatment, open surgery.\par \par Endovascular treatment includes stent -assisted embolization of the wide-neck venous aneurysm of the proximal sigmoid venous sinus. We discussed with the patient my extensive personal experience with this treatment and the results of a recent clinical trial (Shad et al, Interventional Neuroradiology 2020). We discussed the procedure that has two components. The first part consists of catheter angiogram and manometry to assess the degree of stenosis and confirm the presence of the venous aneurysm. This part is typically performed with the patient awake. The second part consists of embolization of the venous aneurysm utilizing stent, coils or both and is performed under general anesthesia.\par \par The risks, benefits and alternatives of the procedure were discussed with the patient in detail. In my personal experience, the risks are very rare, but the possibility is not zero. Risks include stroke, brain hemorrhage, any type of disability (facial or extremity weakness, facial or extremity numbness, speech difficulties, blindness) and death. There are also possible complications related to the incisions such as infection, pain, swelling and bleeding.\par \par The patient is aware that the procedure requires dual antiplatelet therapy for 1-month post-stent (typically aspirin 325 mg daily and clopidogrel 75 mg daily) and antiplatelet monotherapy (typically aspirin 325 mg daily) for additional 11 months post-stent.\par \par PLAN\par Follow-up to finalize treatment plan\par Also considering bariatric surgery

## 2022-03-31 ENCOUNTER — TRANSCRIPTION ENCOUNTER (OUTPATIENT)
Age: 27
End: 2022-03-31

## 2022-04-28 ENCOUNTER — APPOINTMENT (OUTPATIENT)
Dept: NEUROSURGERY | Facility: CLINIC | Age: 27
End: 2022-04-28

## 2022-07-27 ENCOUNTER — NON-APPOINTMENT (OUTPATIENT)
Age: 27
End: 2022-07-27

## 2022-09-01 ENCOUNTER — APPOINTMENT (OUTPATIENT)
Dept: NEUROSURGERY | Facility: CLINIC | Age: 27
End: 2022-09-01

## 2022-09-13 ENCOUNTER — APPOINTMENT (OUTPATIENT)
Dept: NEUROSURGERY | Facility: CLINIC | Age: 27
End: 2022-09-13

## 2022-10-25 ENCOUNTER — APPOINTMENT (OUTPATIENT)
Dept: NEUROSURGERY | Facility: CLINIC | Age: 27
End: 2022-10-25

## 2023-01-19 ENCOUNTER — APPOINTMENT (OUTPATIENT)
Dept: NEUROSURGERY | Facility: CLINIC | Age: 28
End: 2023-01-19

## 2023-01-19 ENCOUNTER — APPOINTMENT (OUTPATIENT)
Dept: NEUROSURGERY | Facility: CLINIC | Age: 28
End: 2023-01-19
Payer: MEDICAID

## 2023-01-19 DIAGNOSIS — H93.A9 PULSATILE TINNITUS, UNSPECIFIED EAR: ICD-10-CM

## 2023-01-19 PROCEDURE — 99213 OFFICE O/P EST LOW 20 MIN: CPT | Mod: 95

## 2023-01-19 NOTE — REASON FOR VISIT
[Home] : at home, [unfilled] , at the time of the visit. [Medical Office: ([facility])___] : at the medical office located in  [Patient] : the patient [Self] : self [Follow-Up: _____] : a [unfilled] follow-up visit

## 2023-01-21 NOTE — ASSESSMENT
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 This is a sequel of chronic severe stenosis at the junction of the transverse and sigmoid venous sinuses. We discussed the natural history of intracranial venous aneurysms and I explained to the patient that these aneurysms DO NOT cause intracranial hemorrhage or stroke. \par \par The pulsatile tinnitus is severe and has a negative impact in social life, work and daily living. We discussed treatment options which include observation, trial of Diamox, endovascular treatment, open surgery.\par \par Endovascular treatment includes stent -assisted embolization of the wide-neck venous aneurysm of the proximal sigmoid venous sinus. We discussed with the patient my extensive personal experience with this treatment and the results of a recent clinical trial (Shad et al, Interventional Neuroradiology 2020). We discussed the procedure that has two components. The first part consists of catheter angiogram and manometry to assess the degree of stenosis and confirm the presence of the venous aneurysm. This part is typically performed with the patient awake. The second part consists of embolization of the venous aneurysm utilizing stent, coils or both and is performed under general anesthesia.\par \par The risks, benefits and alternatives of the procedure were discussed with the patient in detail. In my personal experience, the risks are very rare, but the possibility is not zero. Risks include stroke, brain hemorrhage, any type of disability (facial or extremity weakness, facial or extremity numbness, speech difficulties, blindness) and death. There are also possible complications related to the incisions such as infection, pain, swelling and bleeding.\par \par The patient is aware that the procedure requires dual antiplatelet therapy for 1-month post-stent (typically aspirin 325 mg daily and clopidogrel 75 mg daily) and antiplatelet monotherapy (typically aspirin 325 mg daily) for additional 11 months post-stent.\par \par PLAN\par If she is considering treatment will repeat MRV Head w/wo prior to finalizing plan

## 2023-01-21 NOTE — HISTORY OF PRESENT ILLNESS
[de-identified] : Ms. Cline is a pleasant 27 year old woman who presents today for follow up of RIGHT ear pulsatile tinnitus.  Her pulsatile tinnitus first started over a year ago without any specific event.  However, she has history PCOS and is taking birth control and metformin Since that time it has been getting progressively worse.  It is described as an intermittent, whooshing sound, pt is unsure if it is in sync with her pulse.  Pressing on the RIGHT side of the neck makes the sound go away. Pt has been  seen by opthalmology but reports no abnormalities . She also was evaluated by ENT but was told everything was normal.  MRI IAC did not revealed any abnormalities\par \par Since our last visit, she is still in the process of scheduling her bariatric surgery.

## 2023-01-28 ENCOUNTER — EMERGENCY (EMERGENCY)
Facility: HOSPITAL | Age: 28
LOS: 1 days | Discharge: HOME | End: 2023-01-28
Attending: STUDENT IN AN ORGANIZED HEALTH CARE EDUCATION/TRAINING PROGRAM
Payer: MEDICAID

## 2023-01-28 VITALS
HEART RATE: 73 BPM | TEMPERATURE: 98 F | OXYGEN SATURATION: 99 % | DIASTOLIC BLOOD PRESSURE: 87 MMHG | WEIGHT: 242.51 LBS | HEIGHT: 63 IN | RESPIRATION RATE: 18 BRPM | SYSTOLIC BLOOD PRESSURE: 154 MMHG

## 2023-01-28 LAB
ALBUMIN SERPL ELPH-MCNC: 3.3 G/DL — LOW (ref 3.5–5)
ALP SERPL-CCNC: 42 U/L — SIGNIFICANT CHANGE UP (ref 40–120)
ALT FLD-CCNC: 22 U/L DA — SIGNIFICANT CHANGE UP (ref 10–60)
ANION GAP SERPL CALC-SCNC: 8 MMOL/L — SIGNIFICANT CHANGE UP (ref 5–17)
AST SERPL-CCNC: 13 U/L — SIGNIFICANT CHANGE UP (ref 10–40)
BASOPHILS # BLD AUTO: 0.05 K/UL — SIGNIFICANT CHANGE UP (ref 0–0.2)
BASOPHILS NFR BLD AUTO: 0.9 % — SIGNIFICANT CHANGE UP (ref 0–2)
BILIRUB SERPL-MCNC: 0.2 MG/DL — SIGNIFICANT CHANGE UP (ref 0.2–1.2)
BUN SERPL-MCNC: 11 MG/DL — SIGNIFICANT CHANGE UP (ref 7–18)
CALCIUM SERPL-MCNC: 9 MG/DL — SIGNIFICANT CHANGE UP (ref 8.4–10.5)
CHLORIDE SERPL-SCNC: 107 MMOL/L — SIGNIFICANT CHANGE UP (ref 96–108)
CO2 SERPL-SCNC: 24 MMOL/L — SIGNIFICANT CHANGE UP (ref 22–31)
CREAT SERPL-MCNC: 0.79 MG/DL — SIGNIFICANT CHANGE UP (ref 0.5–1.3)
EGFR: 106 ML/MIN/1.73M2 — SIGNIFICANT CHANGE UP
EOSINOPHIL # BLD AUTO: 0.19 K/UL — SIGNIFICANT CHANGE UP (ref 0–0.5)
EOSINOPHIL NFR BLD AUTO: 3.4 % — SIGNIFICANT CHANGE UP (ref 0–6)
FLUAV AG NPH QL: SIGNIFICANT CHANGE UP
FLUBV AG NPH QL: SIGNIFICANT CHANGE UP
GLUCOSE SERPL-MCNC: 101 MG/DL — HIGH (ref 70–99)
HCG SERPL-ACNC: <1 MIU/ML — SIGNIFICANT CHANGE UP
HCT VFR BLD CALC: 40.1 % — SIGNIFICANT CHANGE UP (ref 34.5–45)
HGB BLD-MCNC: 13.1 G/DL — SIGNIFICANT CHANGE UP (ref 11.5–15.5)
IMM GRANULOCYTES NFR BLD AUTO: 0.2 % — SIGNIFICANT CHANGE UP (ref 0–0.9)
LYMPHOCYTES # BLD AUTO: 3.04 K/UL — SIGNIFICANT CHANGE UP (ref 1–3.3)
LYMPHOCYTES # BLD AUTO: 53.9 % — HIGH (ref 13–44)
MCHC RBC-ENTMCNC: 29.6 PG — SIGNIFICANT CHANGE UP (ref 27–34)
MCHC RBC-ENTMCNC: 32.7 GM/DL — SIGNIFICANT CHANGE UP (ref 32–36)
MCV RBC AUTO: 90.7 FL — SIGNIFICANT CHANGE UP (ref 80–100)
MONOCYTES # BLD AUTO: 0.48 K/UL — SIGNIFICANT CHANGE UP (ref 0–0.9)
MONOCYTES NFR BLD AUTO: 8.5 % — SIGNIFICANT CHANGE UP (ref 2–14)
NEUTROPHILS # BLD AUTO: 1.87 K/UL — SIGNIFICANT CHANGE UP (ref 1.8–7.4)
NEUTROPHILS NFR BLD AUTO: 33.1 % — LOW (ref 43–77)
NRBC # BLD: 0 /100 WBCS — SIGNIFICANT CHANGE UP (ref 0–0)
PLATELET # BLD AUTO: 232 K/UL — SIGNIFICANT CHANGE UP (ref 150–400)
POTASSIUM SERPL-MCNC: 3.9 MMOL/L — SIGNIFICANT CHANGE UP (ref 3.5–5.3)
POTASSIUM SERPL-SCNC: 3.9 MMOL/L — SIGNIFICANT CHANGE UP (ref 3.5–5.3)
PROT SERPL-MCNC: 7.6 G/DL — SIGNIFICANT CHANGE UP (ref 6–8.3)
RBC # BLD: 4.42 M/UL — SIGNIFICANT CHANGE UP (ref 3.8–5.2)
RBC # FLD: 12.5 % — SIGNIFICANT CHANGE UP (ref 10.3–14.5)
SARS-COV-2 RNA SPEC QL NAA+PROBE: SIGNIFICANT CHANGE UP
SODIUM SERPL-SCNC: 139 MMOL/L — SIGNIFICANT CHANGE UP (ref 135–145)
WBC # BLD: 5.64 K/UL — SIGNIFICANT CHANGE UP (ref 3.8–10.5)
WBC # FLD AUTO: 5.64 K/UL — SIGNIFICANT CHANGE UP (ref 3.8–10.5)

## 2023-01-28 PROCEDURE — 85025 COMPLETE CBC W/AUTO DIFF WBC: CPT

## 2023-01-28 PROCEDURE — 84702 CHORIONIC GONADOTROPIN TEST: CPT

## 2023-01-28 PROCEDURE — 80053 COMPREHEN METABOLIC PANEL: CPT

## 2023-01-28 PROCEDURE — 36415 COLL VENOUS BLD VENIPUNCTURE: CPT

## 2023-01-28 PROCEDURE — 87637 SARSCOV2&INF A&B&RSV AMP PRB: CPT

## 2023-01-28 PROCEDURE — 99283 EMERGENCY DEPT VISIT LOW MDM: CPT

## 2023-01-28 PROCEDURE — 99284 EMERGENCY DEPT VISIT MOD MDM: CPT

## 2023-01-28 RX ORDER — IBUPROFEN 200 MG
600 TABLET ORAL ONCE
Refills: 0 | Status: COMPLETED | OUTPATIENT
Start: 2023-01-28 | End: 2023-01-28

## 2023-01-28 NOTE — ED PROVIDER NOTE - CLINICAL SUMMARY MEDICAL DECISION MAKING FREE TEXT BOX
Pt p/w concern about 1 month of subjective swelling of bilateral eyelids and central forehead with no pain/headache nor other symptoms. Exam reassuring. Labs unremarkable. On reassessment pt denies any new or worsening symptoms. Rec PMD f/u ASAP. Most likely non emergent etiology of symptoms- the details of the case, history, and exam make more emergent diagnoses much less likely. Discussed with pt my clinical impression and results, patient given strict return precautions if persistent or worsening of symptoms occurs, and need for close follow up. Pt expressed understanding and agrees with plan. Pt is well appearing with a reassuring exam. Discharge home with PMD or Specialist f/u within 5 days.

## 2023-01-28 NOTE — ED ADULT NURSE NOTE - OBJECTIVE STATEMENT
Patient c/o worsening forehead swelling Patient states " I noticed increase in puffiness on my forehead and above my eyes and I feel pressure behind my eyes'

## 2023-01-28 NOTE — ED PROVIDER NOTE - NSFOLLOWUPINSTRUCTIONS_ED_ALL_ED_FT
You were seen in the emergency room today. Please call your primary doctor to inform them of this ER visit and obtain the next available appointment within the next 5 days. As we discussed, return to the ER if you have any worsening symptoms.    We no longer feel that you need further emergency care or admission to the hospital at this time.    While we have determined that you are currently stable for discharge, we know that things can change. Please seek immediate medical attention or return to the ER if you experience any of the following:  Any worsening or persistent symptoms  Severe Pain  Chest Pain  Difficulty Breathing  Bleeding  Passing Out  Severe Rash  Inability to Eat or Drink  Persistent Fever    Please see a primary care doctor or specialist within 5 days to ensure that you are improving.    Please call the [facility] phone numbers on this document if you have any problems obtaining a follow up appointment.    I wish you well! -Dr Tobar

## 2023-01-28 NOTE — ED PROVIDER NOTE - PATIENT PORTAL LINK FT
You can access the FollowMyHealth Patient Portal offered by [facility] by registering at the following website: [email or web address]. By joining [employer]’s FollowMyHealth portal, you will also be able to view your health information using other applications (apps) compatible with our system.

## 2023-01-28 NOTE — ED PROVIDER NOTE - OBJECTIVE STATEMENT
27-year-old female history of PCOS, pat and pollen allergies, no other past medical history presenting with approximately 1 month of mild swelling of bilateral eyelids and central forehead with no other associated symptoms.  Patient denies any pain over these areas or headache.  Patient states that approximately 1 or 2 months ago a mirror fell on her head though denies any loss of consciousness, vomiting, blurry vision following this injury.  Patient denies focal numbness or weakness, fever or infectious symptoms, URI symptoms, nausea vomiting diarrhea, neck stiffness, urinary symptoms, other recent illnesses or hospitalizations.

## 2023-01-28 NOTE — ED ADULT TRIAGE NOTE - CHIEF COMPLAINT QUOTE
Patient states " I noticed increase in puffiness on my forehead and above my eyes and I feel pressure behind my eyes '  Claims had head injury a month ago ( dropped mirror on forehead )

## 2023-01-28 NOTE — ED PROVIDER NOTE - PHYSICAL EXAMINATION
Vital Signs Reviewed  GEN: Comfortable, NAD  HEENT: ?mild swelling of bilat upper eyelids, No subcutaneous edema nor skin changes of forehead, Clear sclera, PERRLA, NCAT, MMM, Neck Supple  RESP: CTAB, No rales/rhonchi/wheezing  CV: RRR, S1S2, No murmurs  ABD: No TTP, Soft, ND, No masses, No CVA Tenderness  Extrem/Skin: Equal pulses bilat, No cyanosis/edema/rashes  Neuro: AAOx3, CNs Grossly Intact, Nml coordinating mvmts, Equal strength/sensation in all extremities bilat, No Pronator Drift, Nml Gait

## 2023-01-31 ENCOUNTER — APPOINTMENT (OUTPATIENT)
Dept: NEUROSURGERY | Facility: CLINIC | Age: 28
End: 2023-01-31

## 2023-07-15 ENCOUNTER — NON-APPOINTMENT (OUTPATIENT)
Age: 28
End: 2023-07-15

## 2023-08-31 ENCOUNTER — APPOINTMENT (OUTPATIENT)
Dept: ORTHOPEDIC SURGERY | Facility: CLINIC | Age: 28
End: 2023-08-31

## 2023-11-02 ENCOUNTER — APPOINTMENT (OUTPATIENT)
Dept: INTERNAL MEDICINE | Facility: CLINIC | Age: 28
End: 2023-11-02

## 2024-04-11 ENCOUNTER — NON-APPOINTMENT (OUTPATIENT)
Age: 29
End: 2024-04-11

## 2024-09-29 NOTE — ED ADULT NURSE NOTE - NSFALLRSKASSESSTYPE_ED_ALL_ED
Card outpatient   Elmer Vela MD  Vascular Neurology  Office: [phone]  Initial (On Arrival)

## 2025-01-30 ENCOUNTER — APPOINTMENT (OUTPATIENT)
Dept: MRI IMAGING | Facility: CLINIC | Age: 30
End: 2025-01-30

## 2025-03-20 ENCOUNTER — APPOINTMENT (OUTPATIENT)
Dept: NEUROSURGERY | Facility: CLINIC | Age: 30
End: 2025-03-20

## 2025-09-03 ENCOUNTER — APPOINTMENT (OUTPATIENT)
Dept: MRI IMAGING | Facility: CLINIC | Age: 30
End: 2025-09-03

## 2025-09-11 ENCOUNTER — APPOINTMENT (OUTPATIENT)
Dept: NEUROSURGERY | Facility: CLINIC | Age: 30
End: 2025-09-11